# Patient Record
Sex: MALE | Race: WHITE | NOT HISPANIC OR LATINO | Employment: STUDENT | URBAN - METROPOLITAN AREA
[De-identification: names, ages, dates, MRNs, and addresses within clinical notes are randomized per-mention and may not be internally consistent; named-entity substitution may affect disease eponyms.]

---

## 2018-03-14 ENCOUNTER — OFFICE VISIT (OUTPATIENT)
Dept: URGENT CARE | Facility: CLINIC | Age: 18
End: 2018-03-14
Payer: COMMERCIAL

## 2018-03-14 VITALS
HEART RATE: 103 BPM | RESPIRATION RATE: 16 BRPM | BODY MASS INDEX: 18.2 KG/M2 | WEIGHT: 130 LBS | OXYGEN SATURATION: 98 % | TEMPERATURE: 98 F | HEIGHT: 71 IN | SYSTOLIC BLOOD PRESSURE: 144 MMHG | DIASTOLIC BLOOD PRESSURE: 82 MMHG

## 2018-03-14 DIAGNOSIS — B07.0 PLANTAR WART: Primary | ICD-10-CM

## 2018-03-14 DIAGNOSIS — B07.9 VIRAL WARTS, UNSPECIFIED TYPE: ICD-10-CM

## 2018-03-14 PROCEDURE — 99203 OFFICE O/P NEW LOW 30 MIN: CPT | Mod: S$GLB,,, | Performed by: FAMILY MEDICINE

## 2018-03-14 RX ORDER — DEXTROAMPHETAMINE SACCHARATE, AMPHETAMINE ASPARTATE MONOHYDRATE, DEXTROAMPHETAMINE SULFATE AND AMPHETAMINE SULFATE 2.5; 2.5; 2.5; 2.5 MG/1; MG/1; MG/1; MG/1
10 CAPSULE, EXTENDED RELEASE ORAL EVERY MORNING
COMMUNITY

## 2018-03-14 RX ORDER — IMIQUIMOD 12.5 MG/.25G
CREAM TOPICAL
Qty: 1 PACKET | Refills: 0 | OUTPATIENT
Start: 2018-03-14 | End: 2019-03-14

## 2018-03-14 NOTE — PATIENT INSTRUCTIONS
If you were prescribed a narcotic medication, do not drive or operate heavy equipment or machinery while taking these medications.  You must understand that you've received an Urgent Care treatment only and that you may be released before all your medical problems are known or treated. You, the patient, will arrange for follow up care as instructed.  If your condition worsens we recommend that you receive another evaluation at the emergency room immediately or contact your primary medical clinics after hours call service to discuss your concerns.  Please return here or go to the Emergency Department for any concerns or worsening of condition.      Treating Warts     You and your healthcare provider can discuss whether your warts need to be treated.     You and your healthcare provider can talk about what treatment may be best for your wart or warts. To get rid of your warts, your healthcare provider may need to try more than one type of treatment. The methods described below are often used to treat warts.  Types of treatment  · Do nothing. Most warts will resolve within 2 years, even without treatment. So doing nothing is sometimes a good option. This is particularly true for smaller warts that are not causing symptoms.  · Cryotherapy (liquid nitrogen). This kills skin cells by freezing them. It kills the warts and destroys skin infected by the wart-causing virus. This is done in your healthcare providers office and will cause some discomfort. It may take several treatments over several weeks to get rid of the warts.  · Topical medicines. Prescribed topical medicines can be put on the skin. These are usually applied in the healthcare provider's office. But some prescriptions may be applied at home.  · Over-the-counter (OTC) topical treatments. OTC medicines that most often contain salicylic acid may be an option. These patches, liquids, and creams are used at home. The medicine is applied daily to the wart and  nearby skin. It's usually left on overnight. The dead skin is filed down the next day. In 1 to 3 days, the procedure can be repeated. Topical treatments are sometimes combined with cryotherapy.  · Electrodessication and curettage (ED & C).  For this procedure, the healthcare provider applies numbing medicine to the wart. Then the wart is scraped or cut off. This type of treatment is usually not the first line of therapy.  · Laser surgery.  This can vaporize wart tissue or destroy the blood vessels that feed the wart. This is done in the healthcare provider's office.  · Shots (injections). These can be used to treat warts that dont respond to other treatments, such as stubborn or painful warts around the nails. This is done in the healthcare providers office.  When to seek medical treatment  Its a good idea to have your healthcare provider check your warts. That way your provider can rule out any other skin problems. Sometimes a callous or a corn can look like a wart, but the treatments may differ. Treatment can also provide relief from warts that bleed, burn, hurt, or itch. Genital warts should always be treated. They can spread to other people through sexual contact. And they may cause genital or cervical cancer.  Getting good results  After having your warts treated, new warts may still appear. Dont be discouraged. Warts often come back. See your healthcare provider again to discuss this. Your provider can tell you about the treatments that most likely will help clear your skin of warts.   Date Last Reviewed: 2/1/2017 © 2000-2017 [employer]. [address] [place] [address], [place] [address]. All rights reserved. This information is not intended as a substitute for professional medical care. Always follow your healthcare professional's instructions.        What Are Warts?    Warts are common skin growths that can appear anywhere on the body. There are many types of warts. In most cases, they are benign  (not cancer) and harmless. But warts can be embarrassing. And some warts are painful. The good news is that they can be treated.  Who gets warts?  Warts are most common in children and teens. But they can occur at any age. They are also more common in certain jobs, such as those that involve handling meat, poultry, or fish. A weakened immune system may make you more likely to have warts.  What causes warts?  Warts are caused by the human papillomavirus (HPV). There are over 150 types of HPV. This virus can spread between people. But you can be exposed to the virus and not get warts. Warts tend to form where skin is damaged or broken. But they can also appear elsewhere. Left untreated, warts can grow in number. They can also spread to other parts of the body.  Types of warts  There are many types of warts. Some of the most common ones are described below:  · Common warts. These have a raised, rough surface. Enlarged blood vessels in the warts look like dots on the warts surface. Common warts form mainly on the hands, but can appear on other parts of the body.  · Plantar warts. These are warts on the soles of the feet. When you stand or walk, pressure makes plantar warts painful. When they form in clusters, plantar warts are called mosaic warts.  · Periungual warts. These form under and around fingernails. People who bite their nails are more at risk.  · Filiform warts. These are slender, fingerlike growths that can dangle from the skin. They most often appear on the face and neck.  · Flat warts. These are small, smooth growths. They tend to form in clusters on the face, backs of the hands, or legs.  · Genital warts. These can appear on or around the genitals. These warts can spread and are linked to cervical, anal, and other cancers. So it is important to have them treated quickly and to discuss these with sexual partners.     Date Last Reviewed: 2/1/2017  © 7636-9835 [employer]. [address] [place] [address]  Road, [place] [address]. All rights reserved. This information is not intended as a substitute for professional medical care. Always follow your healthcare professional's instructions.        Plantar Warts  Warts are common skin growths that can appear anywhere on the body. Warts on the soles of the feet are called plantar warts. These warts are not a serious health problem. They usually go away without treatment. But plantar warts can be painful when you stand or walk. If this is the case, special cushions can help relieve pressure and pain. Drugstores carry these cushions and you can buy them without a prescription. If cushions do not work and the pain interferes with walking, the wart can be removed.  General care  · Your healthcare provider may remove the plantar wart:  ¨ With prescription medications. These may be placed directly on the wart at each office visit. Or you may be sent home with the medication.  ¨ With a blade, or by freezing (cryotherapy), burning (electrocautery), or laser treatment.  · You may be instructed to treat the wart yourself at home using an over-the-counter wart-removal medication (such as 40 percent salicylic acid). Apply the medication to the wart every day as directed. Avoid the healthy skin around the wart. In between applications, remove the dead wart tissue using the type of file suggested by your health care provider. You will likely need to repeat this process for several weeks to remove the entire wart.  · Warts can spread from your foot to other parts of your body and to other people. Do not scratch or pick at the wart. Wash your hands well before and after touching your warts.  · Warts often come back, even after successful treatment. Return promptly for treatment of any new warts.  Follow-up care  Follow up with your health care provider, or as advised.  When to seek medical advice  · Signs of infection (red streaks, pus, smelly or colored discharge, or fever)  appear.  · You experience heavy bleeding or bleeding that wont stop with light pressure.  · The wart doesnt go away after several weeks of self-care.   · New warts appear on feet, hands, or face.  Date Last Reviewed: 8/19/2015  © 1505-1238 [employer]. [address] [place] [address]. All rights reserved. This information is not intended as a substitute for professional medical care. Always follow your healthcare professional's instructions.

## 2018-03-14 NOTE — PROGRESS NOTES
"Subjective:       Patient ID: Viktor Thornton is a 17 y.o. male.    Vitals:  height is 5' 11" (1.803 m) and weight is 59 kg (130 lb). His temperature is 98.1 °F (36.7 °C). His blood pressure is 144/82 (abnormal) and his pulse is 103. His respiration is 16 and oxygen saturation is 98%.     Chief Complaint: Warts (on left hand and left foot)    Pt presents today with warts on his left hand and his left leg. Pt states hes had them before but never as intense as these. Pt is a student at [employer].       Warts   This is a new problem. The current episode started yesterday. The problem occurs constantly. The problem has been gradually improving. Pertinent negatives include no abdominal pain, chest pain, congestion, coughing, fatigue, fever or headaches. Nothing aggravates the symptoms. He has tried nothing for the symptoms. The treatment provided no relief.     Review of Systems   Constitution: Negative for fatigue and fever.   HENT: Negative for congestion.    Eyes: Negative for blurred vision.   Cardiovascular: Negative for chest pain.   Respiratory: Negative for cough.    Skin: Negative for color change.   Gastrointestinal: Negative for abdominal pain.   Neurological: Negative for headaches.       Objective:      Physical Exam   Constitutional: He is oriented to person, place, and time. He appears well-developed and well-nourished.   HENT:   Head: Normocephalic and atraumatic. Head is without abrasion, without contusion and without laceration.   Right Ear: External ear normal.   Left Ear: External ear normal.   Nose: Nose normal.   Mouth/Throat: Oropharynx is clear and moist.   Eyes: Conjunctivae, EOM and lids are normal. Pupils are equal, round, and reactive to light.   Neck: Trachea normal, full passive range of motion without pain and phonation normal. Neck supple.   Pulmonary/Chest: No stridor. No respiratory distress.   Musculoskeletal: Normal range of motion.   Neurological: He is alert and oriented to person, " place, and time.   Skin: Skin is warm, dry and intact. Capillary refill takes less than 2 seconds. No abrasion, no bruising, no burn, no ecchymosis, no laceration, no lesion and no rash noted. No erythema.        Psychiatric: He has a normal mood and affect. His speech is normal and behavior is normal. Judgment and thought content normal. Cognition and memory are normal.   Nursing note and vitals reviewed.      Assessment:       1. Plantar wart    2. Viral warts, unspecified type        Plan:         Plantar wart  -     Ambulatory referral to Dermatology    Viral warts, unspecified type    Other orders  -     imiquimod (ALDARA) 5 % cream; Apply to affected area three times weekly  Dispense: 1 packet; Refill: 0    called in for pt [facility] @3:54 pm  Called clinic  @ 4:12 pm

## 2018-03-22 ENCOUNTER — TELEPHONE (OUTPATIENT)
Dept: INTERNAL MEDICINE | Facility: CLINIC | Age: 18
End: 2018-03-22

## 2024-04-25 ENCOUNTER — NON-APPOINTMENT (OUTPATIENT)
Age: 24
End: 2024-04-25

## 2024-04-25 ENCOUNTER — APPOINTMENT (OUTPATIENT)
Dept: OPHTHALMOLOGY | Facility: CLINIC | Age: 24
End: 2024-04-25